# Patient Record
(demographics unavailable — no encounter records)

---

## 2025-07-15 NOTE — PHYSICAL EXAM
[Chaperone Declined] : Chaperone offered however refused by patient, [Appropriately responsive] : appropriately responsive [Alert] : alert [No Acute Distress] : no acute distress [No Lymphadenopathy] : no lymphadenopathy [Regular Rate Rhythm] : regular rate rhythm [Clear to Auscultation B/L] : clear to auscultation bilaterally [Soft] : soft [Non-tender] : non-tender [Non-distended] : non-distended [No HSM] : No HSM [No Lesions] : no lesions [No Mass] : no mass [Oriented x3] : oriented x3

## 2025-07-15 NOTE — PROCEDURE
[Amenorrhea] : Amenorrhea [Transvaginal Ultrasound] : transvaginal ultrasound [FreeTextEntry4] : SIUP AT 9.2 FH POS SUBCHORIONIC HEMATOMA, KATERIN DARCY 2/15/26

## 2025-07-15 NOTE — HISTORY OF PRESENT ILLNESS
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

## 2025-07-15 NOTE — DISCUSSION/SUMMARY
[FreeTextEntry1] : 20 YO   LMP 5/12  AMENORRHEA, AT 9 + WKS DARCY 26, history of anemia SONO SIUP AT 9.2 WKS FHR POS SUBCHORINIC HEMATOMA DARCY 2/15/2026  PAP RECORDS PN LABS B6 25 BID  NT TO SCHEDULE